# Patient Record
Sex: FEMALE | Race: WHITE | Employment: OTHER | URBAN - METROPOLITAN AREA
[De-identification: names, ages, dates, MRNs, and addresses within clinical notes are randomized per-mention and may not be internally consistent; named-entity substitution may affect disease eponyms.]

---

## 2023-01-01 ENCOUNTER — OFFICE VISIT (OUTPATIENT)
Dept: GERIATRIC MEDICINE | Age: 88
End: 2023-01-01
Payer: COMMERCIAL

## 2023-01-01 DIAGNOSIS — D50.9 IRON DEFICIENCY ANEMIA, UNSPECIFIED IRON DEFICIENCY ANEMIA TYPE: ICD-10-CM

## 2023-01-01 DIAGNOSIS — R53.1 WEAKNESS: Primary | ICD-10-CM

## 2023-01-01 DIAGNOSIS — E11.9 TYPE 2 DIABETES MELLITUS WITHOUT COMPLICATION, WITHOUT LONG-TERM CURRENT USE OF INSULIN (HCC): ICD-10-CM

## 2023-01-01 DIAGNOSIS — F32.A DEPRESSION, UNSPECIFIED DEPRESSION TYPE: ICD-10-CM

## 2023-01-01 DIAGNOSIS — I10 HYPERTENSION, UNSPECIFIED TYPE: ICD-10-CM

## 2023-01-01 DIAGNOSIS — M19.90 ARTHRITIS: ICD-10-CM

## 2023-01-01 PROCEDURE — 99315 NF DSCHRG MGMT 30 MIN/LESS: CPT | Performed by: INTERNAL MEDICINE

## 2023-02-28 LAB
APPEARANCE, URINE: ABNORMAL
BACTERIA, URINE: ABNORMAL /HPF
BILIRUBIN, URINE: NEGATIVE
BLOOD, URINE: NEGATIVE
COLOR, URINE: YELLOW
GLUCOSE, URINE: ABNORMAL MG/DL
KETONES, URINE: NEGATIVE MG/DL
LEUKOCYTE ESTERASE, URINE: ABNORMAL
NITRITE, URINE: NEGATIVE
PH, URINE: 5 (ref 5–8)
PROTEIN, URINE: ABNORMAL MG/DL
RBC, URINE: ABNORMAL /HPF (ref 0–5)
SPECIFIC GRAVITY, URINE: 1.01 (ref 1–1.03)
SQUAMOUS EPITHELIAL CELLS, URINE: 3 /HPF
TRANSITIONAL EPITHELIAL CELLS, URINE: 1 /HPF
UROBILINOGEN, URINE: <2 MG/DL (ref 0–1.9)
WBC, URINE: >100 /HPF (ref 0–5)

## 2023-03-02 LAB — URINE CULTURE: ABNORMAL

## 2023-06-26 ENCOUNTER — OFFICE VISIT (OUTPATIENT)
Dept: GERIATRIC MEDICINE | Age: 88
End: 2023-06-26

## 2023-06-26 DIAGNOSIS — F32.A DEPRESSION, UNSPECIFIED DEPRESSION TYPE: ICD-10-CM

## 2023-06-26 DIAGNOSIS — E11.9 TYPE 2 DIABETES MELLITUS WITHOUT COMPLICATION, WITHOUT LONG-TERM CURRENT USE OF INSULIN (HCC): ICD-10-CM

## 2023-06-26 DIAGNOSIS — R53.1 WEAKNESS: Primary | ICD-10-CM

## 2023-06-26 DIAGNOSIS — M19.90 ARTHRITIS: ICD-10-CM

## 2023-06-26 DIAGNOSIS — D50.9 IRON DEFICIENCY ANEMIA, UNSPECIFIED IRON DEFICIENCY ANEMIA TYPE: ICD-10-CM

## 2023-06-26 DIAGNOSIS — I10 HYPERTENSION, UNSPECIFIED TYPE: ICD-10-CM

## 2023-06-27 RX ORDER — FUROSEMIDE 20 MG/1
20 TABLET ORAL DAILY
COMMUNITY

## 2023-06-27 RX ORDER — GABAPENTIN 300 MG/1
300 CAPSULE ORAL 2 TIMES DAILY
COMMUNITY

## 2023-06-27 RX ORDER — VIT C/E/ZN/COPPR/LUTEIN/ZEAXAN 60 MG-6 MG
1 CAPSULE ORAL DAILY
COMMUNITY

## 2023-06-27 RX ORDER — FERROUS SULFATE 325(65) MG
325 TABLET ORAL
COMMUNITY

## 2023-06-27 RX ORDER — AMMONIUM LACTATE 12 G/100G
CREAM TOPICAL EVERY OTHER DAY
COMMUNITY

## 2023-06-27 RX ORDER — FOLIC ACID 1 MG/1
1 TABLET ORAL DAILY
COMMUNITY

## 2023-06-27 RX ORDER — LANOLIN ALCOHOL/MO/W.PET/CERES
1000 CREAM (GRAM) TOPICAL DAILY
COMMUNITY

## 2023-06-27 RX ORDER — M-VIT,TX,IRON,MINS/CALC/FOLIC 27MG-0.4MG
1 TABLET ORAL DAILY
COMMUNITY

## 2023-06-27 RX ORDER — OMEPRAZOLE 20 MG/1
20 CAPSULE, DELAYED RELEASE ORAL DAILY
COMMUNITY

## 2023-06-27 RX ORDER — ASPIRIN 81 MG/1
81 TABLET, CHEWABLE ORAL DAILY
COMMUNITY

## 2023-06-27 RX ORDER — DOCUSATE SODIUM 100 MG/1
100 CAPSULE, LIQUID FILLED ORAL 2 TIMES DAILY
COMMUNITY

## 2023-06-27 RX ORDER — SERTRALINE HYDROCHLORIDE 25 MG/1
75 TABLET, FILM COATED ORAL DAILY
COMMUNITY

## 2023-06-27 RX ORDER — POTASSIUM CHLORIDE 750 MG/1
10 TABLET, EXTENDED RELEASE ORAL DAILY
COMMUNITY

## 2023-06-27 RX ORDER — GLIPIZIDE 10 MG/1
10 TABLET, FILM COATED, EXTENDED RELEASE ORAL DAILY
COMMUNITY

## 2023-06-27 RX ORDER — TROLAMINE SALICYLATE 10 G/100G
CREAM TOPICAL PRN
COMMUNITY

## 2023-06-27 RX ORDER — LISINOPRIL 5 MG/1
5 TABLET ORAL DAILY
COMMUNITY

## 2023-07-22 ENCOUNTER — OFFICE VISIT (OUTPATIENT)
Dept: GERIATRIC MEDICINE | Age: 88
End: 2023-07-22

## 2023-07-22 DIAGNOSIS — F32.A DEPRESSION, UNSPECIFIED DEPRESSION TYPE: ICD-10-CM

## 2023-07-22 DIAGNOSIS — D50.9 IRON DEFICIENCY ANEMIA, UNSPECIFIED IRON DEFICIENCY ANEMIA TYPE: ICD-10-CM

## 2023-07-22 DIAGNOSIS — R53.1 WEAKNESS: Primary | ICD-10-CM

## 2023-07-22 DIAGNOSIS — E11.9 TYPE 2 DIABETES MELLITUS WITHOUT COMPLICATION, WITHOUT LONG-TERM CURRENT USE OF INSULIN (HCC): ICD-10-CM

## 2023-07-22 DIAGNOSIS — M19.90 ARTHRITIS: ICD-10-CM

## 2023-07-22 DIAGNOSIS — I10 HYPERTENSION, UNSPECIFIED TYPE: ICD-10-CM

## 2023-07-29 NOTE — PROGRESS NOTES
SNF PROGRESS NOTE      Cc- weakness/debility       Patient is a Bipin Ya Aish 80 y.o. female who is being seen at [facility] for general debility and weakness for her 30 day visit. She was just seen by podiatry over the last 30 days         No past medical history on file. Patient has no known allergies. VS reviewed    Gen- Alert and oriented x 3   Heart- RRR no murmur no LE edema   Lungs- CTA b/l no resp distress LE oxygen   Abd- bs x 4                   There is no problem list on file for this patient.           Assessment and Plan    Gen Weakness  HTN  Metoprolol, lisinopril   DM  Metformin, glipizide  Iron deficiency Anemia   Ferrous sulfate  Arthritis  Depression  Sertraline       Sean Davis DO     Electronically signed by: Anali Xavier DO on 7/22/2023

## 2023-08-16 ENCOUNTER — OFFICE VISIT (OUTPATIENT)
Dept: GERIATRIC MEDICINE | Age: 88
End: 2023-08-16

## 2023-08-16 DIAGNOSIS — I10 HYPERTENSION, UNSPECIFIED TYPE: ICD-10-CM

## 2023-08-16 DIAGNOSIS — M19.90 ARTHRITIS: ICD-10-CM

## 2023-08-16 DIAGNOSIS — F32.A DEPRESSION, UNSPECIFIED DEPRESSION TYPE: ICD-10-CM

## 2023-08-16 DIAGNOSIS — R53.1 WEAKNESS: Primary | ICD-10-CM

## 2023-08-16 DIAGNOSIS — E11.9 TYPE 2 DIABETES MELLITUS WITHOUT COMPLICATION, WITHOUT LONG-TERM CURRENT USE OF INSULIN (HCC): ICD-10-CM

## 2023-08-16 DIAGNOSIS — D50.9 IRON DEFICIENCY ANEMIA, UNSPECIFIED IRON DEFICIENCY ANEMIA TYPE: ICD-10-CM

## 2023-09-15 ENCOUNTER — OFFICE VISIT (OUTPATIENT)
Dept: GERIATRIC MEDICINE | Age: 88
End: 2023-09-15

## 2023-09-15 DIAGNOSIS — N18.4 CKD (CHRONIC KIDNEY DISEASE) STAGE 4, GFR 15-29 ML/MIN (HCC): ICD-10-CM

## 2023-09-15 DIAGNOSIS — I10 HYPERTENSION, UNSPECIFIED TYPE: ICD-10-CM

## 2023-09-15 DIAGNOSIS — R53.1 WEAKNESS: Primary | ICD-10-CM

## 2023-09-15 DIAGNOSIS — E11.9 TYPE 2 DIABETES MELLITUS WITHOUT COMPLICATION, WITHOUT LONG-TERM CURRENT USE OF INSULIN (HCC): ICD-10-CM

## 2023-09-15 DIAGNOSIS — M19.90 ARTHRITIS: ICD-10-CM

## 2023-09-15 DIAGNOSIS — F32.A DEPRESSION, UNSPECIFIED DEPRESSION TYPE: ICD-10-CM

## 2023-09-15 DIAGNOSIS — D50.9 IRON DEFICIENCY ANEMIA, UNSPECIFIED IRON DEFICIENCY ANEMIA TYPE: ICD-10-CM

## 2023-09-21 NOTE — PROGRESS NOTES
SNF PROGRESS NOTE      Cc- weakness/debility       Patient is a Deatra Cradle Aish 80 y.o. female who is being seen at [facility] for general debility and weakness for her 30 day visit. Over the last 30 days, metformin was discontinued. There have been no significant events otherwise. No past medical history on file. Patient has no known allergies.     VS reviewed    Gen- Alert and oriented x 3   Heart- RRR no murmur no LE edema   Lungs- CTA b/l no resp distress LE oxygen   Abd- bs x 4           Assessment and Plan    Gen Weakness  HTN  Metoprolol, lisinopril   DM   Glipizide  Metformin discontinued secondary to CKD   Iron deficiency Anemia   Ferrous sulfate  Arthritis  CKD stage 4   Depression  Sertraline       Mikala Alarcon DO [facility]     Electronically signed by: Mickey Naylor DO on 9/15/2023

## 2023-09-25 ENCOUNTER — HOSPITAL ENCOUNTER (INPATIENT)
Dept: DATA CONVERSION | Facility: HOSPITAL | Age: 88
LOS: 2 days | Discharge: HOME | End: 2023-09-27
Attending: INTERNAL MEDICINE | Admitting: STUDENT IN AN ORGANIZED HEALTH CARE EDUCATION/TRAINING PROGRAM
Payer: MEDICARE

## 2023-09-25 DIAGNOSIS — R09.02 HYPOXEMIA: ICD-10-CM

## 2023-09-25 LAB
ALANINE AMINOTRANSFERASE (SGPT) (U/L) IN SER/PLAS: 14 U/L (ref 7–45)
ALBUMIN (G/DL) IN SER/PLAS: 4.2 G/DL (ref 3.4–5)
ALKALINE PHOSPHATASE (U/L) IN SER/PLAS: 131 U/L (ref 33–136)
ANION GAP IN SER/PLAS: 13 MMOL/L (ref 10–20)
ASPARTATE AMINOTRANSFERASE (SGOT) (U/L) IN SER/PLAS: 10 U/L (ref 9–39)
BASOPHILS (10*3/UL) IN BLOOD BY AUTOMATED COUNT: 0.02 X10E9/L (ref 0–0.1)
BASOPHILS/100 LEUKOCYTES IN BLOOD BY AUTOMATED COUNT: 0.1 % (ref 0–2)
BILIRUBIN TOTAL (MG/DL) IN SER/PLAS: 0.4 MG/DL (ref 0–1.2)
CALCIUM (MG/DL) IN SER/PLAS: 9.2 MG/DL (ref 8.6–10.3)
CARBON DIOXIDE, TOTAL (MMOL/L) IN SER/PLAS: 27 MMOL/L (ref 21–32)
CHLORIDE (MMOL/L) IN SER/PLAS: 95 MMOL/L (ref 98–107)
CREATININE (MG/DL) IN SER/PLAS: 1.83 MG/DL (ref 0.5–1.05)
EOSINOPHILS (10*3/UL) IN BLOOD BY AUTOMATED COUNT: 0.12 X10E9/L (ref 0–0.4)
EOSINOPHILS/100 LEUKOCYTES IN BLOOD BY AUTOMATED COUNT: 0.9 % (ref 0–6)
ERYTHROCYTE DISTRIBUTION WIDTH (RATIO) BY AUTOMATED COUNT: 18.6 % (ref 11.5–14.5)
ERYTHROCYTE MEAN CORPUSCULAR HEMOGLOBIN CONCENTRATION (G/DL) BY AUTOMATED: 30.4 G/DL (ref 32–36)
ERYTHROCYTE MEAN CORPUSCULAR VOLUME (FL) BY AUTOMATED COUNT: 86 FL (ref 80–100)
ERYTHROCYTES (10*6/UL) IN BLOOD BY AUTOMATED COUNT: 3.76 X10E12/L (ref 4–5.2)
GFR FEMALE: 25 ML/MIN/1.73M2
GLUCOSE (MG/DL) IN SER/PLAS: 548 MG/DL (ref 74–99)
HEMATOCRIT (%) IN BLOOD BY AUTOMATED COUNT: 32.2 % (ref 36–46)
HEMOGLOBIN (G/DL) IN BLOOD: 9.8 G/DL (ref 12–16)
IMMATURE GRANULOCYTES/100 LEUKOCYTES IN BLOOD BY AUTOMATED COUNT: 0.4 % (ref 0–0.9)
INR IN PPP BY COAGULATION ASSAY: 1.1 (ref 0.9–1.1)
LEUKOCYTES (10*3/UL) IN BLOOD BY AUTOMATED COUNT: 13.4 X10E9/L (ref 4.4–11.3)
LYMPHOCYTES (10*3/UL) IN BLOOD BY AUTOMATED COUNT: 1.21 X10E9/L (ref 0.8–3)
LYMPHOCYTES/100 LEUKOCYTES IN BLOOD BY AUTOMATED COUNT: 9 % (ref 13–44)
MAGNESIUM (MG/DL) IN SER/PLAS: 2.15 MG/DL (ref 1.6–2.4)
MONOCYTES (10*3/UL) IN BLOOD BY AUTOMATED COUNT: 0.25 X10E9/L (ref 0.05–0.8)
MONOCYTES/100 LEUKOCYTES IN BLOOD BY AUTOMATED COUNT: 1.9 % (ref 2–10)
NATRIURETIC PEPTIDE B (PG/ML) IN SER/PLAS: 162 PG/ML (ref 0–99)
NEUTROPHILS (10*3/UL) IN BLOOD BY AUTOMATED COUNT: 11.79 X10E9/L (ref 1.6–5.5)
NEUTROPHILS/100 LEUKOCYTES IN BLOOD BY AUTOMATED COUNT: 87.7 % (ref 40–80)
NRBC (PER 100 WBCS) BY AUTOMATED COUNT: 0 /100 WBC (ref 0–0)
PLATELETS (10*3/UL) IN BLOOD AUTOMATED COUNT: 295 X10E9/L (ref 150–450)
POCT GLUCOSE: 441 MG/DL (ref 74–99)
POCT GLUCOSE: 524 MG/DL (ref 74–99)
POTASSIUM (MMOL/L) IN SER/PLAS: 5.6 MMOL/L (ref 3.5–5.3)
PROTEIN TOTAL: 6.9 G/DL (ref 6.4–8.2)
PROTHROMBIN TIME (PT) IN PPP BY COAGULATION ASSAY: 11.9 SEC (ref 9.8–12.8)
SODIUM (MMOL/L) IN SER/PLAS: 129 MMOL/L (ref 136–145)
TROPONIN I, HIGH SENSITIVITY: 5 NG/L (ref 0–13)
TROPONIN I, HIGH SENSITIVITY: 6 NG/L (ref 0–13)
TROPONIN I, HIGH SENSITIVITY: NORMAL
UREA NITROGEN (MG/DL) IN SER/PLAS: 36 MG/DL (ref 6–23)

## 2023-09-26 LAB
ALBUMIN (G/DL) IN SER/PLAS: 3.7 G/DL (ref 3.4–5)
ANION GAP IN SER/PLAS: 15 MMOL/L (ref 10–20)
APPEARANCE, URINE: CLEAR
BACTERIA, URINE: ABNORMAL /HPF
BILIRUBIN, URINE: NEGATIVE
BLOOD, URINE: NEGATIVE
CALCIUM (MG/DL) IN SER/PLAS: 8.9 MG/DL (ref 8.6–10.3)
CARBON DIOXIDE, TOTAL (MMOL/L) IN SER/PLAS: 24 MMOL/L (ref 21–32)
CHLORIDE (MMOL/L) IN SER/PLAS: 101 MMOL/L (ref 98–107)
COLOR, URINE: ABNORMAL
CREATININE (MG/DL) IN SER/PLAS: 1.5 MG/DL (ref 0.5–1.05)
ERYTHROCYTE DISTRIBUTION WIDTH (RATIO) BY AUTOMATED COUNT: 18.7 % (ref 11.5–14.5)
ERYTHROCYTE MEAN CORPUSCULAR HEMOGLOBIN CONCENTRATION (G/DL) BY AUTOMATED: 31.1 G/DL (ref 32–36)
ERYTHROCYTE MEAN CORPUSCULAR VOLUME (FL) BY AUTOMATED COUNT: 85 FL (ref 80–100)
ERYTHROCYTES (10*6/UL) IN BLOOD BY AUTOMATED COUNT: 3.22 X10E12/L (ref 4–5.2)
GFR FEMALE: 32 ML/MIN/1.73M2
GLUCOSE (MG/DL) IN SER/PLAS: 257 MG/DL (ref 74–99)
GLUCOSE, URINE: ABNORMAL MG/DL
HEMATOCRIT (%) IN BLOOD BY AUTOMATED COUNT: 27.3 % (ref 36–46)
HEMOGLOBIN (G/DL) IN BLOOD: 8.5 G/DL (ref 12–16)
KETONES, URINE: NEGATIVE MG/DL
LEUKOCYTE ESTERASE, URINE: ABNORMAL
LEUKOCYTES (10*3/UL) IN BLOOD BY AUTOMATED COUNT: 13.2 X10E9/L (ref 4.4–11.3)
MAGNESIUM (MG/DL) IN SER/PLAS: 1.99 MG/DL (ref 1.6–2.4)
NITRITE, URINE: NEGATIVE
NRBC (PER 100 WBCS) BY AUTOMATED COUNT: 0 /100 WBC (ref 0–0)
PH, URINE: 5 (ref 5–8)
PHOSPHATE (MG/DL) IN SER/PLAS: 3.9 MG/DL (ref 2.5–4.9)
PLATELETS (10*3/UL) IN BLOOD AUTOMATED COUNT: 308 X10E9/L (ref 150–450)
POCT GLUCOSE: 281 MG/DL (ref 74–99)
POCT GLUCOSE: 381 MG/DL (ref 74–99)
POCT GLUCOSE: 454 MG/DL (ref 74–99)
POTASSIUM (MMOL/L) IN SER/PLAS: 4.7 MMOL/L (ref 3.5–5.3)
PROTEIN, URINE: NEGATIVE MG/DL
RBC, URINE: ABNORMAL /HPF (ref 0–5)
SODIUM (MMOL/L) IN SER/PLAS: 135 MMOL/L (ref 136–145)
SPECIFIC GRAVITY, URINE: 1.01 (ref 1–1.03)
SQUAMOUS EPITHELIAL CELLS, URINE: <1 /HPF
UREA NITROGEN (MG/DL) IN SER/PLAS: 34 MG/DL (ref 6–23)
UROBILINOGEN, URINE: <2 MG/DL (ref 0–1.9)
WBC, URINE: ABNORMAL /HPF (ref 0–5)

## 2023-09-27 LAB
ANION GAP IN SER/PLAS: 15 MMOL/L (ref 10–20)
CALCIUM (MG/DL) IN SER/PLAS: 8.8 MG/DL (ref 8.6–10.3)
CARBON DIOXIDE, TOTAL (MMOL/L) IN SER/PLAS: 24 MMOL/L (ref 21–32)
CHLORIDE (MMOL/L) IN SER/PLAS: 100 MMOL/L (ref 98–107)
CREATININE (MG/DL) IN SER/PLAS: 1.38 MG/DL (ref 0.5–1.05)
ERYTHROCYTE DISTRIBUTION WIDTH (RATIO) BY AUTOMATED COUNT: 18.6 % (ref 11.5–14.5)
ERYTHROCYTE MEAN CORPUSCULAR HEMOGLOBIN CONCENTRATION (G/DL) BY AUTOMATED: 30.9 G/DL (ref 32–36)
ERYTHROCYTE MEAN CORPUSCULAR VOLUME (FL) BY AUTOMATED COUNT: 85 FL (ref 80–100)
ERYTHROCYTES (10*6/UL) IN BLOOD BY AUTOMATED COUNT: 3.27 X10E12/L (ref 4–5.2)
GFR FEMALE: 35 ML/MIN/1.73M2
GLUCOSE (MG/DL) IN SER/PLAS: 371 MG/DL (ref 74–99)
HEMATOCRIT (%) IN BLOOD BY AUTOMATED COUNT: 27.8 % (ref 36–46)
HEMOGLOBIN (G/DL) IN BLOOD: 8.6 G/DL (ref 12–16)
LEUKOCYTES (10*3/UL) IN BLOOD BY AUTOMATED COUNT: 11.1 X10E9/L (ref 4.4–11.3)
MAGNESIUM (MG/DL) IN SER/PLAS: 2.09 MG/DL (ref 1.6–2.4)
NRBC (PER 100 WBCS) BY AUTOMATED COUNT: 0 /100 WBC (ref 0–0)
PLATELETS (10*3/UL) IN BLOOD AUTOMATED COUNT: 269 X10E9/L (ref 150–450)
POCT GLUCOSE: 343 MG/DL (ref 74–99)
POCT GLUCOSE: 389 MG/DL (ref 74–99)
POTASSIUM (MMOL/L) IN SER/PLAS: 4.1 MMOL/L (ref 3.5–5.3)
SODIUM (MMOL/L) IN SER/PLAS: 135 MMOL/L (ref 136–145)
UREA NITROGEN (MG/DL) IN SER/PLAS: 30 MG/DL (ref 6–23)
URINE CULTURE: ABNORMAL

## 2023-09-28 ENCOUNTER — OFFICE VISIT (OUTPATIENT)
Dept: GERIATRIC MEDICINE | Age: 88
End: 2023-09-28

## 2023-09-28 DIAGNOSIS — N39.0 URINARY TRACT INFECTION WITHOUT HEMATURIA, SITE UNSPECIFIED: Primary | ICD-10-CM

## 2023-09-28 DIAGNOSIS — D50.9 IRON DEFICIENCY ANEMIA, UNSPECIFIED IRON DEFICIENCY ANEMIA TYPE: ICD-10-CM

## 2023-09-28 DIAGNOSIS — E11.9 TYPE 2 DIABETES MELLITUS WITHOUT COMPLICATION, WITHOUT LONG-TERM CURRENT USE OF INSULIN (HCC): ICD-10-CM

## 2023-09-28 DIAGNOSIS — F32.A DEPRESSION, UNSPECIFIED DEPRESSION TYPE: ICD-10-CM

## 2023-09-28 DIAGNOSIS — R53.1 WEAKNESS: ICD-10-CM

## 2023-09-29 LAB
ATRIAL RATE: 72 BPM
P AXIS: 102 DEGREES
P OFFSET: 184 MS
P ONSET: 148 MS
PR INTERVAL: 146 MS
Q ONSET: 221 MS
QRS COUNT: 12 BEATS
QRS DURATION: 76 MS
QT INTERVAL: 386 MS
QTC CALCULATION(BAZETT): 422 MS
QTC FREDERICIA: 410 MS
R AXIS: 63 DEGREES
T AXIS: 60 DEGREES
T OFFSET: 414 MS
VENTRICULAR RATE: 72 BPM

## 2023-09-30 NOTE — H&P
"    History of Present Illness:   HPI:    HPI:  Pt is a 89 year old woman with a past medical history of hypertension, diabetes, osteoporosis, history of left hip fracture status post repair, CAD, ROMANA who presents to the ED from her assisted living facility after a fall.  Upon initial arrival to the  ED per reports she was lethargic and unable to give a complete history due to recently receiving IV pain meds while on route. On arrival to the medical floor, patient is alert and conversant.  She reports that she lives alone in her assisted living and  earlier today she was using her walker to maneuver in her home when she \"felt as if my foot slipped and gave out and I fell\".  She denies hitting her head.  She denies history of frequent falls. She denied dizziness, vision changes, palpitations, shortness  of breath, nausea or vomiting, numbness or tingling at the time of fall.  upon arrival to the ED she complained of severe right shoulder pain and x-ray showed acute humeral fracture. She was also noted to have a blood sugar greater than 500 in the ED-given  IVF, insulin not ordered in the ED. In terms of her blood sugar on arrival to the floor, initially patient was insistent that she did not want her blood sugar repeated nor did she want any doses of insulin.  She repeatedly stated \"I am 89, I do not need  to go through all of this\".  Eventually patient did agree to a one-time Accu-Chek this evening and dose of insulin.  Blood sugar on recheck was 441 and patient was given SSI aspart.    In the ED, VSS.  Noted leukocytosis to 13.4.  Hemoglobin 9.8.  Initial blood sugar 548 and recheck on arrival to the floor was 441. Corrected sodium 140.  Potassium 5.6.  Creatinine 1.83.  Troponin negative x2.  UA showed large leukocyte Estrace with  5-20 white cells.  CT head negative.  CT C-spine showed multilevel spondylosis without acute fracture.  Chest x-ray negative.  X-ray of the wrist on the right showed impacted " intra-articular distal radial fracture with soft tissue swelling.  X-ray  of elbow negative for fracture.  X-ray shoulder showed proximal humeral fracture with impaction and displacement. Ortho was contacted who recommended nonoperative management and Ortho evaluation in the a.m.  Patient was given a dose of fentanyl in the  ED and shortly thereafter had a notable desaturation to 83% on room air and was placed on 2 L.  ED team also reported that she became more confused and lethargic after getting dose of fentanyl. Pt admitted for further management.    PMH: as above    Meds: reviewed     Surg hx:left hip fracture repair    FH:noncontributory    SH:Lives alone in assisted living.  Uses a walker.  Denies tobacco, EtOH and illicits.    ROS: Detailed ROS reviewed and negative unless stated above     Comorbidities:   Comorbidites   ·  Comorbid Conditions diabetes, hypertension   ·  Diabetes Type Type 2   ·  Insulin Dependent no   ·  DM Acuity or Status controlled   ·  DM Complications none     Family History:   ·   Family history of diabetes mellitus:     Social History:   Social History   Smoking Status never smoker (1)   Alcohol Use denies(1)   Drug Use denies (1)              Allergies:  ·  No Known Allergies :     Medications Prior to Admission:     acetaminophen 325 mg oral tablet: 2 tab(s) orally every 8 hours, As Needed - for fever - for pain  acetaminophen 325 mg oral tablet: 2 tab(s) orally every 4 hours, As Needed - for fever  trolamine salicylate 10% topical lotion: Apply topically to hip once a day  aspirin 81 mg oral tablet, chewable: 1 tab(s) orally once a day  docusate sodium 100 mg oral capsule: 1 cap(s) orally 2 times a day  ferrous sulfate 325 mg (65 mg elemental iron) oral tablet: 1 tab(s) orally once a day  folic acid 1 mg oral tablet: 1 tab(s) orally once a day  furosemide 20 mg oral tablet: 1 tab(s) orally once a day  gabapentin 300 mg oral capsule: 1 cap(s) orally 2 times a day  glipiZIDE 10 mg  oral tablet, extended release: 1 tab(s) orally once a day  Lac-Hydrin 12% topical cream: Apply topically to feet and legs every other day  lisinopril 5 mg oral tablet: 1 tab(s) orally once a day  metoprolol tartrate 25 mg oral tablet: 0.5 tab(s) orally 2 times a day  Ocuvite Lutein oral capsule: 1 cap(s) orally once a day  omeprazole 40 mg oral delayed release capsule: 1 cap(s) orally once a day  potassium chloride 10 mEq oral tablet, extended release: 1 tab(s) orally once a day  sertraline 25 mg oral tablet: 3 tab(s) orally once a day  Therapeutic-M oral tablet: 1 tab(s) orally once a day  traMADol 50 mg oral tablet: 1 tab(s) orally every 6 hours, As Needed - for pain  Vitamin B12 1000 mcg oral tablet: 1 tab(s) orally once a day.    Objective:     Objective Information:      T   P  R  BP   MAP  SpO2   Value  36.7  81  16  153/77   119  96%  Date/Time 9/25 22:42 9/25 22:42 9/25 22:42 9/25 22:42  9/25 19:08 9/25 22:42  Range  (36.1C - 36.7C )  (77 - 92 )  (15 - 20 )  (141 - 156 )/ (67 - 90 )  (119 - 119 )  (85% - 99% )   As of 25-Sep-2023 23:07:00, patient is on 3 L/min of oxygen via room air.      Pain reported at 9/25 23:07: 8 = Severe    Physical Exam by System     Constitutional: nad, nontoxic, hard of hearing   Eyes: anicteric sclera, eomi   ENMT: moist oropharynx, no oral lesions noted   Respiratory/Thorax: 2L, ctab, nonlabored, completes  full sentences   Cardiovascular: rrr, no mgr, pulses 2+   Gastrointestinal: +bs,soft,nt,nd, no guarding nor  rebound   Musculoskeletal: no evidence of atrophy, 5/5 strength   Extremities: no edema. RUE-significant swelling and  ecchymosis over R. arm, ace wrap in place to fingertips, sling in place, fingertips are wwp, nontender, intact rom though limited by wrapping   Neurological: orientedx3, sensation intact, follows  commands, no facial droop, grossly nonfocal   Psychological: calm/cooperative   Skin: no rashes or lesions     Medications     Medications:           Continuous Medications       --------------------------------  No continuous medications are active       Scheduled Medications       --------------------------------    1. Acetaminophen:  975  mg  Oral  Every 8 Hours    2. Aspirin Chewable:  81  mg  Oral  Daily    3. Cyanocobalamin:  1000  microgram(s)  Oral  Daily    4. Docusate:  100  mg  Oral  2 Times a Day    5. Enoxaparin SubCutaneous:  30  mg  SubCutaneous  Every 24 Hours    6. Ferrous Sulfate:  325  mg  Oral  Daily    7. Folic Acid:  1  mg  Oral  Daily    8. Gabapentin:  300  mg  Oral  2 Times a Day    9. Insulin Lispro Mild Corrective Scale:  unit(s)  SubCutaneous  3 Times a Day Before Meals    10. Lisinopril:  5  mg  Oral  Daily    11. Metoprolol Tartrate:  12.5  mg  Oral  2 Times a Day    12. Pantoprazole:  40  mg  Oral  Daily    13. Sertraline:  75  mg  Oral  Daily    14. Sodium Zirconium Cyclosilicate:  10  gram(s)  Oral  Once         PRN Medications       --------------------------------    1. Dextrose 50% in Water Injectable:  25  gram(s)  IntraVenous Push  Every 15 Minutes    2. Glucagon Injectable:  1  mg  IntraMuscular  Every 15 Minutes    3. Melatonin:  3  mg  Oral  At Bedtime    4. oxyCODONE Immediate Release:  5  mg  Oral  Every 4 Hours    5. Sodium Chloride 0.9% Injectable Flush:  10  mL  IntraVenous Flush  Every 8 Hours and as Needed         Conditional Medication Orders       --------------------------------    1. Dextrose 10% in Water Infusion:  500  mL  IntraVenous  <Continuous>      Recent Lab Results     Results:    CBC: 9/25/2023 17:25              \     Hgb     /                              \     9.8 L    /  WBC  ----------------  Plt               13.4 H    ----------------    295              /     Hct     \                              /     32.2 L    \            RBC: 3.76 L    MCV: 86     Neutrophil %: 87.7      CMP: 9/25/2023 17:25  NA+        Cl-     BUN  /                         129 L   95 L    36 H  /  --------------------------------  Glucose                ---------------------------  548 HH    K+     HCO3-   Creat \                         5.6 H   27    1.83 H  \           \  T Bili  /                    \  0.4  /  AST  x ---- x ALT        10 x ---- x 14         /  Alk P   \               /  131  \  Calcium : 9.2     Anion Gap : 13     Albumin : 4.2     T Protein : 6.9           Coagulation: 9/25/2023 17:25  PT  /                    11.9  /  -------<    INR          ----------<      1.1  PTT\                              \                       Radiology Results     Results:        Impression:    No focal infiltrate.        Signed by Abdulaziz Mcgarth MD     Xray Chest 1 View [Sep 25 2023  7:31PM]      Impression:     1. Impacted intra-articular distal radial fracture.      2. Soft tissue swelling.      3. Question sequela of old triquetral avulsion.         Signed by Abdulaziz Mcgrath MD     Xray Wrist Complete Min 3 View [Sep 25 2023  7:31PM]      Impression:     No fracture.                 Signed by Abdulaziz Mcgrath MD     Xray Elbow Complete Min 3 View [Sep 25 2023  7:31PM]      Impression:     Proximal humeral fracture.         Signed by Abdulaziz Mcgrath MD     Xray Shoulder Complete Min 2 Views [Sep 25 2023  7:31PM]      Impression:  Xray Wrist Complete Min 3 View [Sep 25 2023  6:26PM]      Impression:  Xray Elbow Complete Min 3 View [Sep 25 2023  6:25PM]      Impression:    Multilevel spondylosis without acute osseous abnormality of the  cervical spine.     MACRO:  None     CT C Spine without Contrast [Sep 25 2023  5:34PM]      Impression:    No CT evidence of acute intracranial injury.        MACRO:  None     CT Head without Contrast [Sep 25 2023  5:32PM]      Assessment and Plan:   Assessment:    A/P:  Pt is a 89 year old woman with a past medical history of hypertension, diabetes, osteoporosis, history of left hip fracture status post repair, CAD, ROMANA who presents to the ED from her assisted living  "facility after a fall. Imaging shows acute R. displaced  humeral fracture and impacted intra-articular distal radial fracture. Ortho recommending conservative management.     #Mechanical fall resulting in right displaced humeral fracture and distal radial fracture  #Shoulder pain due to above  #Hyperglycemia  #Leukocytosis, suspect reactive  #Hyperkalemia to 5.6  #RACHAEL  #ROMANA  #DM-not on insulin  #HTN  #Osteoporosis    -unwitnessed fall at home resulting in RUE fracture  -ortho rec non-operative management per discussion with ED team  -f/u AM ortho recs  -pain control with scheduled tylenol and as needed oxy  -continue home lisinopril 5mg and metop 12.5mg  -given IVF bolus in the ED  -BG on floor 441-given 10 units asp per SSI  -note: pt initially did not desire accuchecks nor insulin, though was finally agreeable. pt very well may have an issue with future frequent accucheck  -wean O2 as tolerated. pt was noted to desate in the ED after receiving fentanyl  -f/u urine culture, pt did not report urinary symptoms and thus abx were not started    Misc  low sodium diet  lovenox    Dispo:pending workup and PT    CODE STATUS: DNR/DNI. Code status addressed and patient reports \"I am 89 and would not want aggressive measures\"          Electronic Signatures for Addendum Section:   Dasha Menchaca) (Signed Addendum 26-Sep-2023 05:29)   Certification (Inpatient): I certify that this individual will require an anticipated TWO midnight hospital stay in order to effectively diagnose, treat, and  provide a safe discharge disposition for the above acute complaints as well exacerbation of known chronic illnesses.     Electronic Signatures:  Dasha Menchaca)  (Signed 26-Sep-2023 04:56)   Authored: History of Present Illness, Comorbidities,  Family History, Social History, Allergies, Medications Prior to Admission, Objective, Assessment and Plan, Note Completion      Last Updated: 26-Sep-2023 05:29 by " "Dasha Menchaca)    References:  1.  Data Referenced From \"Patient Profile - Adult v2\" 26-Sep-2023 00:16   "

## 2023-09-30 NOTE — PROGRESS NOTES
Service: Palliative Care     Subjective Data:   RYLEE OSULLIVAN is a 89 year old Female who is Hospital Day # 3.    Additional Information:    Patient will discharge back to the [facility] and will attempt rehab. Only waiting on Ortho restrictions and PT/OT evals.     Update 1041:  Ortho gave non-weight baring upper extremity recommendation. [facility] stating they can eval and treat for PT/OT in house and we do not need precert or evals prior to discharge. Medical team notified as patient and daughter have remained clear  that they are hopeful for discharge back to facility as soon as possible. Patient was started on IV abx for UTI. Medical team to assess for possible PO treatment and discharge. Facility staff will follow for pall/hospice needs post rehab. Palliative care  to sign off. MD, TCC, RN notified.    Objective Data:     Objective Information:      T   P  R  BP   MAP  SpO2   Value  36.6  85  18  142/77      98%  Date/Time 9/27 8:00 9/27 8:00 9/27 8:00 9/27 8:00    9/27 8:00  Range  (36.2C - 37C )  (61 - 85 )  (17 - 18 )  (104 - 142 )/ (54 - 77 )    (93% - 98% )   As of 27-Sep-2023 08:00:00, patient is on 3 L/min of oxygen via nasal cannula.  Highest temp of 37 C was recorded at 9/26 16:00      Pain reported at 9/26 20:00: 0 = None    Assessment and Plan:   Daily Risk Screen:  ·  Does patient have an indwelling urinary catheter? n/a consulting service    ·  Does patient have a central line? n/a consulting service      Code Status:  ·  Code Status DNAR with Added Limitations     Advance Care Planning:  Advance Care Planning: Patient didn't wish to or  was unable to provide advance care plan      Attestation:   Note Completion:  Provider/Team Pager # [phone]         Electronic Signatures:  Florinda Monteiro ([facility])  (Signed 27-Sep-2023 10:45)   Authored: Service, Subjective Data, Objective Data, Assessment  and Plan, Note Completion      Last Updated: 27-Sep-2023 10:45 by Thania  Florinda (LSW)

## 2023-09-30 NOTE — DISCHARGE SUMMARY
Send Summary:   Discharge Summary Providers:  Provider Role Provider Name   · Referring Dasha Menchaca   · Attending Glenys Ortega   · Consulting Brady Astorga   · Primary Eileen Lr       Note Recipients: Eileen Lr MD - [id number]  []       Discharge:    Summary:   Admission Date: .25-Sep-2023 16:43:00   Discharge Date: 27-Sep-2023   Attending Physician at Discharge: Glenys Ortega   Admission Reason: Right proximal humerus and distal  radius fracture(1)   Final Discharge Diagnoses: UTI (urinary tract infection)  shoulder fracture   RACHAEL   Procedures: none   Condition at Discharge: Satisfactory   Disposition at Discharge: Skilled Nursing Facility  (SNF)   Vital Signs:        T   P  R  BP   MAP  SpO2   Value  36.6  85  18  142/77      98%  Date/Time 9/27 8:00 9/27 8:00 9/27 8:00 9/27 8:00    9/27 8:00  Range  (36.2C - 37C )  (61 - 85 )  (17 - 18 )  (104 - 142 )/ (54 - 77 )    (93% - 98% )   As of 27-Sep-2023 08:00:00, patient is on 3 L/min of oxygen via nasal cannula.  Highest temp of 37 C was recorded at 9/26 16:00    Date:            Weight/Scale Type:  Height:   26-Sep-2023 00:16  50.2  kg / bed  149.8  cm  Physical Exam:    Constitutional: nad, nontoxic, hard of hearing  Eyes: anicteric sclera, eomi  ENMT: moist oropharynx, no oral lesions noted  Respiratory/Thorax: 2L sat 98%, ctab, nonlabored, completes full sentences  Cardiovascular: rrr, no mgr, pulses 2+  Gastrointestinal: +bs, soft, nt, nd, no guarding nor rebound  Musculoskeletal: no evidence of atrophy, 5/5 strength  Extremities: no edema. RUE -significant swelling and ecchymosis over R. arm, ace wrap in place to fingertips, sling in place, fingertips are wwp, nontender, intact rom though limited by wrapping  Neurological: orientedx3, sensation intact, follows commands, no facial droop, grossly nonfocal  Psychological: calm/cooperative  Skin: no rashes or lesions    Hospital Course:    Pt is a 89 year old woman  "with a past medical history of hypertension, diabetes, osteoporosis, history of left hip fracture status post repair, CAD, ROMANA who presents  to the ED from her assisted living facility after a fall.  Upon initial arrival to the ED per reports she was lethargic and unable to give a complete history due to recently receiving IV pain meds while on route. On arrival to the medical floor, patient  is alert and conversant.  She reports that she lives alone in her assisted living and earlier today she was using her walker to maneuver in her home when she \"felt as if my foot slipped and gave out and I fell\".  She denies hitting her head.  She denies  history of frequent falls. She denied dizziness, vision changes, palpitations, shortness of breath, nausea or vomiting, numbness or tingling at the time of fall.  upon arrival to the ED she complained of severe right shoulder pain and x-ray showed acute  humeral fracture. She was also noted to have a blood sugar greater than 500 in the ED-given IVF, insulin not ordered in the ED. In terms of her blood sugar on arrival to the floor, initially patient was insistent that she did not want her blood sugar  repeated nor did she want any doses of insulin.  She repeatedly stated \"I am 89, I do not need to go through all of this\".  Eventually patient did agree to a one-time Accu-Chek this evening and dose of insulin.  Blood sugar on recheck was 441 and patient  was given SSI aspart.    In the ED, VSS.  Noted leukocytosis to 13.4.  Hemoglobin 9.8.  Initial blood sugar 548 and recheck on arrival to the floor was 441. Corrected sodium 140.  Potassium 5.6.  Creatinine 1.83.  Troponin negative x2.  UA showed large leukocyte Estrace with  5-20 white cells.  CT head negative.  CT C-spine showed multilevel spondylosis without acute fracture.  Chest x-ray negative.  X-ray of the wrist on the right showed impacted intra-articular distal radial fracture with soft tissue swelling.  X-ray  of elbow " negative for fracture.  X-ray shoulder showed proximal humeral fracture with impaction and displacement. Ortho was contacted who recommended nonoperative management and Ortho evaluation in the a.m.  Patient was given a dose of fentanyl in the  ED and shortly thereafter had a notable desaturation to 83% on room air and was placed on 2 L.  ED team also reported that she became more confused and lethargic after getting dose of fentanyl. Pt admitted for further management.      Hospital course:  Patient was evaluated by orthopedic surgery no plans for any surgical intervention at this time, sling was placed and activity restriction was ordered, PT/OT ordered but did not work with patient yet however her facility called and they can evaluate and  work there no need to wait for PT/OT evaluation at the hospital, UA was compatible with UTI was started on ceftriaxone however urine culture today came back positive for lactobacillus and was started on Augmentin 1 tablet twice daily for 5 days, patient  blood glucose was very high, patient was declining insulin coverage and Accu-Chek, she is aware of the risk from not taking insulin to lower her glucose level, on admission she had RACHAEL improved with fluid, lisinopril and Lasix were held for this reason,  patient and her daughter are very anxious to leave the hospital and start therapy at her facility, patient to repeat a BMP at the facility 9/29 and if that is improving can resume her Lasix and lisinopril, as the patient and her daughter are anxious to  leave the hospital and RACHAEL is trending down nicely with the fluid, patient encouraged to keep herself hydrated and BMP to be repeated outpatient, remained clinically and vitally stable for discharge to facility today.  Palliative were consulted plans  for hospice as for now, will follow with outpatient palliative.      Discharge time>35 mins      Discharge Information:    and Continuing Care:   Lab Results - Pending:     None  Radiology Results - Pending: None   Discharge Instructions:    Activity:           activity with assistance.          No pushing, pulling, or lifting objects greater than 5 pounds.            Weight-bearing Instructions: no weight-bearing right arm.            sling to RUE at all times, may remove briefly for skin checks and hygiene purpose    Nutrition/Diet:           diabetic/carbohydrate counted    Labs:           Lab Test(s):   Basic Metabolic Panel          Date To Be Drawn:   9/29/2023          Call Results To:   Primary care physician/physician at the facility    Additional Orders:           Additional Instructions:   - Start taking Augmentin 1 tablet twice daily for 5 days for your UTI    - your kidney numbers were high on admission,  they are coming down nicely with fluid, keep yourself hydrated, for that reason Lasix and lisinopril were well held, your kidney numbers will be checked again in 2 days 9/29/2023 and if those are back to  normal your primary care doctor/the physician at the facility can resume your lisinopril and Lasix then.  -Your sugars were really high in the hospital and we do respect you declining taking the insulin, please continue taking your home glipizide when you go back to the facility    -You will be evaluated by physical therapy/Occupational Therapy at your facility to address your therapy needs    -Follow-up with orthopedic surgeon in 2 weeks, please call for appointment    -Continue the rest of your home medications without any changes.    Rehab Services:           Occupational Therapy Orders:   Eval and Treat ([facility])          Physical Therapy Orders:   Eval [facility] Treat ([facility])    Care Recommendation:           I recommend that INPATIENT care is required at::   Skilled          Estimated Stay:   30 - 180 days    Follow Up Appointments:    Follow-Up Appointment 01:           Physician/Dept/Service:   Dr. Meyer          Reason for  Referral:   Follow-up on fracture          Call to Schedule in:   2 weeks          Phone Number:   [phone]    Follow-Up Appointment 02:           Physician/Dept/Service:   Primary care physician/physician at the facility          Reason for Referral:   Hospital follow-up, RACHAEL to address resuming lisinopril and Lasix          Call to Schedule in:   2-3 days    Discharge Medications: Home Medication   trolamine salicylate 10% topical lotion - Apply topically to hip once a day  aspirin 81 mg oral tablet, chewable - 1 tab(s) orally once a day  docusate sodium 100 mg oral capsule - 1 cap(s) orally 2 times a day  ferrous sulfate 325 mg (65 mg elemental iron) oral tablet - 1 tab(s) orally once a day  folic acid 1 mg oral tablet - 1 tab(s) orally once a day  gabapentin 300 mg oral capsule - 1 cap(s) orally 2 times a day  glipiZIDE 10 mg oral tablet, extended release - 1 tab(s) orally once a day  Lac-Hydrin 12% topical cream - Apply topically to feet and legs every other day  metoprolol tartrate 25 mg oral tablet - 0.5 tab(s) orally 2 times a day  Ocuvite Lutein oral capsule - 1 cap(s) orally once a day  omeprazole 40 mg oral delayed release capsule - 1 cap(s) orally once a day  potassium chloride 10 mEq oral tablet, extended release - 1 tab(s) orally once a day  sertraline 25 mg oral tablet - 3 tab(s) orally once a day  Therapeutic-M oral tablet - 1 tab(s) orally once a day  Vitamin B12 1000 mcg oral tablet - 1 tab(s) orally once a day  furosemide 20 mg oral tablet - 1 tab(s) orally once a day  lisinopril 5 mg oral tablet - 1 tab(s) orally once a day  amoxicillin-clavulanate 875 mg-125 mg oral tablet - 1 tab(s) orally 2 times a day      PRN Medication   acetaminophen 325 mg oral tablet - 2 tab(s) orally every 8 hours, As Needed - for fever - for pain  acetaminophen 325 mg oral tablet - 2 tab(s) orally every 4 hours, As Needed - for fever  traMADol 50 mg oral tablet - 1 tab(s) orally every 6 hours, As Needed - for pain  "    DNR Status:   ·  Code Status Code Status order at time of discharge: DNR and No Intubation       Electronic Signatures:  Glenys Ortega)  (Signed 27-Sep-2023 13:02)   Authored: Send Summary, Summary Content, Ongoing Care,  DNR Status, Note Completion      Last Updated: 27-Sep-2023 13:02 by Glenys Ortega)    References:  1.  Data Referenced From \"Consult-Orthopaedics\" 26-Sep-2023 11:07   "

## 2023-09-30 NOTE — PROGRESS NOTES
History and Physical      CHIEF COMPLAINT:  UTI     History of Present Illness:      A 80 y.o. female who is being seen at [facility] s/ hospital stay for UTI and BRAD. Patient sugars were elevated but she declined insulin during her stay. Her lasix and lisinopril were held due to BRAD. REVIEW OF SYSTEMS:  A complete 10 Point review of systems was preformed and negative unless previously stated      PMH:  Arthritis   HTN  DM  Iron deficiency Anemia   Depression     Surgical History:  No past surgical history on file. Medications Prior to Admission:    Prior to Admission medications    Medication Sig Start Date End Date Taking? Authorizing Provider   trolamine salicylate (ASPERCREME) 10 % cream Apply topically as needed for Pain Apply topically as needed. For hip pain    Historical Provider, MD   aspirin 81 MG chewable tablet Take 1 tablet by mouth daily Indications: Preventative Treatment    Historical Provider, MD   docusate sodium (COLACE) 100 MG capsule Take 1 capsule by mouth 2 times daily Indications: Constipation    Historical Provider, MD   ferrous sulfate (IRON 325) 325 (65 Fe) MG tablet Take 1 tablet by mouth daily (with breakfast) Indications: Anemia    Historical Provider, MD   folic acid (FOLVITE) 1 MG tablet Take 1 tablet by mouth daily Indications: Anemia    Historical Provider, MD   furosemide (LASIX) 20 MG tablet Take 1 tablet by mouth daily Indications: Edema    Historical Provider, MD   gabapentin (NEURONTIN) 300 MG capsule Take 1 capsule by mouth in the morning and 1 capsule in the evening. Indications: Nerve Disease.     Historical Provider, MD   glipiZIDE (GLUCOTROL XL) 10 MG extended release tablet Take 1 tablet by mouth daily Indications: Type 2 Diabetes Do not crush    Historical Provider, MD   ammonium lactate (AMLACTIN) 12 % cream Apply topically every other day Indications: Dryness Confined to a Specific area of the Body Apply topically every other day to feet & legs

## 2023-09-30 NOTE — PROGRESS NOTES
Service: Orthopaedics     Subjective Data:   RYLEE OSULLIVAN is a 89 year old Female who is Hospital Day # 3.     Sitting up in bed upon entering room.  Appears somewhat agitated.  Verbalizing no complaints.    Objective Data:     Objective Information:      T   P  R  BP   MAP  SpO2   Value  36.6  85  18  142/77      98%  Date/Time 9/27 8:00 9/27 8:00 9/27 8:00 9/27 8:00    9/27 8:00  Range  (36.2C - 37C )  (61 - 85 )  (17 - 18 )  (104 - 142 )/ (54 - 77 )    (93% - 98% )   As of 27-Sep-2023 08:00:00, patient is on 3 L/min of oxygen via nasal cannula.  Highest temp of 37 C was recorded at 9/26 16:00      Pain reported at 9/26 20:00: 0 = None    Physical Exam by System:    Constitutional: Well developed, awake/alert, no distress,  alert and cooperative   ENMT: mucous membranes moist   Respiratory/Thorax: Chest symmetric, no apparent  respiratory distress   Gastrointestinal: Nondistended, soft, non-tender   Extremities: Right upper extremity in sling with  splint sensation appears intact in right upper extremity and fingers of the right hand present radial pulse   Neurological: alert, intact senses   Psychological: Appears agitated   Skin: Warm and dry     Medication:    Medications:      ALTERNATIVE MEDICINES:    1. Melatonin:  3  mg  Oral  At Bedtime   PRN         ANTI-INFECTIVES:    1. cefTRIAXone 1 gram/ Dextrose 5% IVPB Premixed Soln 50 mL:  50  mL  IntraVenous Piggyback  Every 24 Hours      CARDIOVASCULAR AGENTS:    1. Metoprolol Tartrate:  12.5  mg  Oral  2 Times a Day      CENTRAL NERVOUS SYSTEM AGENTS:    1. Acetaminophen:  975  mg  Oral  Every 8 Hours    2. Aspirin Chewable:  81  mg  Oral  Daily    3. oxyCODONE Immediate Release:  5  mg  Oral  Every 4 Hours   PRN       4. Gabapentin:  300  mg  Oral  2 Times a Day      COAGULATION MODIFIERS:    1. Enoxaparin SubCutaneous:  30  mg  SubCutaneous  Every 24 Hours      GASTROINTESTINAL AGENTS:    1. Docusate:  100  mg  Oral  2 Times a Day    2. Pantoprazole:   40  mg  Oral  Daily      METABOLIC AGENTS:    1. Insulin Lispro Mild Corrective Scale:  unit(s)  SubCutaneous  3 Times a Day Before Meals    2. Dextrose 50% in Water Injectable:  25  gram(s)  IntraVenous Push  Every 15 Minutes   PRN       3. Glucagon Injectable:  1  mg  IntraMuscular  Every 15 Minutes   PRN         NUTRITIONAL PRODUCTS:    1. Ferrous Sulfate:  325  mg  Oral  Daily    2. Sodium Chloride 0.9% Injectable Flush:  10  mL  IntraVenous Flush  Every 8 Hours and as Needed   PRN       3. Cyanocobalamin:  1000  microgram(s)  Oral  Daily    4. Folic Acid:  1  mg  Oral  Daily      PSYCHOTHERAPEUTIC AGENTS:    1. Sertraline:  75  mg  Oral  Daily      TOPICAL AGENTS:    1. Lidocaine 4% TransDermal:  2  patch  TransDermal  Every 24 Hours          Conditional Medication Orders       --------------------------------    1. Dextrose 10% in Water Infusion:  500  mL  IntraVenous  <Continuous>          Currently Suspended Medications       --------------------------------    1. Lisinopril:  5  mg  Oral  Daily      Recent Lab Results:    Results:    CBC: 9/27/2023 08:20              \     Hgb     /                              \     8.6 L    /  WBC  ----------------  Plt               11.1       ----------------    269              /     Hct     \                              /     27.8 L    \            RBC: 3.27 L    MCV: 85         Assessment and Plan:   Comorbidities:  ·  Comorbidity anemia   ·  Anemia unknown     Code Status:  ·  Code Status DNAR with Added Limitations     Advance Care Planning:  Advance Care Planning: Patient didn't wish to or  was unable to provide advance care plan        Impression 1: Right proximal and distal radius fractures   Plan for Impression 1: No surgery indicated at this  time  Splint to right upper extremity  Nonweightbearing in right upper extremity  Physical and Occupational Therapy are on consult  Okay for discharge to [facility] from orthopedic standpoint when medically able, please call  with any further questions or concerns, thank you for allowing us to participate in this patient's care, appreciate the consult       Electronic Signatures:  Patricio Arzate (DO)  (Signed 27-Sep-2023 10:23)   Authored: Assessment and Plan   Co-Signer: Service, Subjective Data, Objective Data, Assessment and Plan, Note Completion  Violeta Staley (PAC)  (Signed 27-Sep-2023 09:47)   Authored: Service, Subjective Data, Objective Data, Assessment  and Plan, Note Completion      Last Updated: 27-Sep-2023 10:23 by Patricio Arzate ()

## 2023-10-16 ENCOUNTER — APPOINTMENT (OUTPATIENT)
Dept: ORTHOPEDIC SURGERY | Facility: CLINIC | Age: 88
End: 2023-10-16
Payer: MEDICARE

## 2023-10-16 ENCOUNTER — OFFICE VISIT (OUTPATIENT)
Dept: GERIATRIC MEDICINE | Age: 88
End: 2023-10-16

## 2023-10-16 DIAGNOSIS — F32.A DEPRESSION, UNSPECIFIED DEPRESSION TYPE: ICD-10-CM

## 2023-10-16 DIAGNOSIS — I10 HYPERTENSION, UNSPECIFIED TYPE: ICD-10-CM

## 2023-10-16 DIAGNOSIS — D50.9 IRON DEFICIENCY ANEMIA, UNSPECIFIED IRON DEFICIENCY ANEMIA TYPE: ICD-10-CM

## 2023-10-16 DIAGNOSIS — R53.1 WEAKNESS: Primary | ICD-10-CM

## 2023-10-16 DIAGNOSIS — M19.90 ARTHRITIS: ICD-10-CM

## 2023-10-16 DIAGNOSIS — N18.4 CKD (CHRONIC KIDNEY DISEASE) STAGE 4, GFR 15-29 ML/MIN (HCC): ICD-10-CM

## 2023-10-16 DIAGNOSIS — E11.9 TYPE 2 DIABETES MELLITUS WITHOUT COMPLICATION, WITHOUT LONG-TERM CURRENT USE OF INSULIN (HCC): ICD-10-CM

## 2023-10-25 NOTE — PROGRESS NOTES
Discharge Summary       Discharge Disposition     Discharge Condition N/A      Discharge time 23 min       Medications   Current Outpatient Medications   Medication Sig Dispense Refill    trolamine salicylate (ASPERCREME) 10 % cream Apply topically as needed for Pain Apply topically as needed. For hip pain      aspirin 81 MG chewable tablet Take 1 tablet by mouth daily Indications: Preventative Treatment      docusate sodium (COLACE) 100 MG capsule Take 1 capsule by mouth 2 times daily Indications: Constipation      ferrous sulfate (IRON 325) 325 (65 Fe) MG tablet Take 1 tablet by mouth daily (with breakfast) Indications: Anemia      folic acid (FOLVITE) 1 MG tablet Take 1 tablet by mouth daily Indications: Anemia      furosemide (LASIX) 20 MG tablet Take 1 tablet by mouth daily Indications: Edema      gabapentin (NEURONTIN) 300 MG capsule Take 1 capsule by mouth in the morning and 1 capsule in the evening. Indications: Nerve Disease.       glipiZIDE (GLUCOTROL XL) 10 MG extended release tablet Take 1 tablet by mouth daily Indications: Type 2 Diabetes Do not crush      ammonium lactate (AMLACTIN) 12 % cream Apply topically every other day Indications: Dryness Confined to a Specific area of the Body Apply topically every other day to feet & legs      lisinopril (PRINIVIL;ZESTRIL) 5 MG tablet Take 1 tablet by mouth daily Indications: High Blood Pressure Disorder      metFORMIN (GLUCOPHAGE) 500 MG tablet Take 1 tablet by mouth 2 times daily (with meals) Indications: Type 2 Diabetes      metoprolol tartrate (LOPRESSOR) 25 MG tablet Take 1 tablet by mouth 2 times daily Indications: High Blood Pressure Disorder      Multiple Vitamins-Minerals (OCUVITE-LUTEIN) CAPS multivitamin Take 1 capsule by mouth daily Indications: Nutritional Support      omeprazole (PRILOSEC) 20 MG delayed release capsule Take 1 capsule by mouth daily Indications: Acid

## 2024-03-06 NOTE — CONSULTS
Service:   Service: Orthopaedics     Consult:  Consult requested by (Attending Name): Dasha Menchaca   Reason: 89 year old woman presenting after fall at  SNF resulting in R. humeral fracture.     History of Present Illness:   Admission Reason: Right proximal humerus and distal  radius fracture   HPI:    RYLEE OSULLIVAN is a 89 year old Female assisted living resident past medical history significant for diabetes, hypertension, CAD, osteoporosis, anemia (iron deficiency)  presents following a mechanical fall.  She is oriented to self and year but cannot recall details around her fall yesterday & claims to have had right shoulder surgery 2 days ago.  Now complains of moderate proximal right upper extremity discomfort and  mild right wrist discomfort.  Her right forearm is splinted and right upper extremity is in a sling.  She tells me that she is left-hand dominant and uses a walker for ambulation.  ER imaging reveals: Right proximal humeral fracture and right impacted  intra-articular distal radial fracture.    Past Medical/Surgical History:        Medical History:   History of non-ST elevation myocardial infarction (NSTEMI) :    CAD (coronary artery disease):    History of fracture of left hip:    Hearing loss:    Iron deficiency anemia:    Osteoporosis:    Urinary incontinence in female:    Uterine prolapse:    History of basal cell carcinoma (BCC) of skin:    Colon polyp:    Anxiety:    Hyperlipidemia:    Insulin dependent type 2 diabetes mellitus:        Surg History:   History of non-cataract eye surgery:    History of colonoscopy:    History of open reduction and internal fixation (ORIF) procedure :    History of basal cell carcinoma excision:     Review Family/Social History and ROS:   Family History:  ·   Family history of diabetes mellitus:     Social History:    Smoking Status: never smoker  (1)   Alcohol Use: denies (1)   Drug Use: denies  (1)            Allergies:  ·  No Known Allergies :      Objective:     Objective Information:        T   P  R  BP   MAP  SpO2   Value  36.2  79  18  113/58   119  93%  Date/Time 9/26 8:00 9/26 8:00 9/26 8:00 9/26 8:00  9/25 19:08 9/26 8:00  Range  (36.1C - 36.7C )  (77 - 92 )  (15 - 20 )  (113 - 156 )/ (58 - 90 )  (119 - 119 )  (85% - 99% )   As of 25-Sep-2023 23:07:00, patient is on 3 L/min of oxygen via room air.    Physical Exam by System:    Constitutional: Well developed, awake/alert, oriented  to self and year, no distress, alert and cooperative   Eyes: EOMI, clear sclera   ENMT: mucous membranes moist, no apparent injury,  no lesions seen, hard of hearing   Head/Neck: Neck supple   Respiratory/Thorax: Patent airways, CTAB, normal  breath sounds with good chest expansion, thorax symmetric   Cardiovascular: Regular, rate and rhythm, no murmurs,  2+ equal pulses of the extremities, normal S 1and S 2   Gastrointestinal: Nondistended, soft, non-tender,  no rebound tenderness or guarding   Musculoskeletal: Right upper extremity sling present,  proximal right upper extremity with ecchymosis, edema, is tender to touch  Distal right upper extremity splint present, motor function intact to right fingers, brisk cap refill less than 2 seconds to right fingertips   Extremities: normal extremities, no cyanosis edema,  contusions or wounds, no clubbing   Lymphatic: No significant lymphadenopathy   Psychological: Appropriate mood and behavior   Skin: Warm and dry, no lesions, no rashes     Medications:    Medications:          Continuous Medications       --------------------------------  No continuous medications are active       Scheduled Medications       --------------------------------    1. Acetaminophen:  975  mg  Oral  Every 8 Hours    2. Aspirin Chewable:  81  mg  Oral  Daily    3. cefTRIAXone 1 gram/ Dextrose 5% IVPB Premixed Soln 50 mL:  50  mL  IntraVenous Piggyback  Every 24 Hours    4. Cyanocobalamin:  1000  microgram(s)  Oral  Daily    5. Docusate:  100  mg   Oral  2 Times a Day    6. Enoxaparin SubCutaneous:  30  mg  SubCutaneous  Every 24 Hours    7. Ferrous Sulfate:  325  mg  Oral  Daily    8. Folic Acid:  1  mg  Oral  Daily    9. Gabapentin:  300  mg  Oral  2 Times a Day    10. Insulin Lispro Mild Corrective Scale:  unit(s)  SubCutaneous  3 Times a Day Before Meals    11. Metoprolol Tartrate:  12.5  mg  Oral  2 Times a Day    12. Pantoprazole:  40  mg  Oral  Daily    13. Sertraline:  75  mg  Oral  Daily         PRN Medications       --------------------------------    1. Dextrose 50% in Water Injectable:  25  gram(s)  IntraVenous Push  Every 15 Minutes    2. Glucagon Injectable:  1  mg  IntraMuscular  Every 15 Minutes    3. Melatonin:  3  mg  Oral  At Bedtime    4. oxyCODONE Immediate Release:  5  mg  Oral  Every 4 Hours    5. Sodium Chloride 0.9% Injectable Flush:  10  mL  IntraVenous Flush  Every 8 Hours and as Needed         Conditional Medication Orders       --------------------------------    1. Dextrose 10% in Water Infusion:  500  mL  IntraVenous  <Continuous>         Currently Suspended Medications       --------------------------------    1. Lisinopril:  5  mg  Oral  Daily      Recent Lab Results:    Results:        I have reviewed these laboratory results:    Complete Blood Count  26-Sep-2023 05:42:00      Result Value    White Blood Cell Count  13.2   H   Nucleated Erythrocyte Count  0.0    Red Blood Cell Count  3.22   L   HGB  8.5   L   HCT  27.3   L   MCV  85    MCHC  31.1   L   PLT  308    RDW-CV  18.7   H     Renal Function Panel  26-Sep-2023 05:42:00      Result Value    Glucose, Serum  257   H   NA  135   L   K  4.7    CL  101    Bicarbonate, Serum  24    Anion Gap, Serum  15    BUN  34   H   CREAT  1.50   H   GFR Female  32   A   Calcium, Serum  8.9    Phosphorus, Serum  3.9    ALB  3.7      Magnesium, Serum  26-Sep-2023 05:42:00      Result Value    Magnesium, Serum  1.99      Urinalysis with Culture if Indicated  26-Sep-2023 01:11:00      Result  Value    Color, Urine  STRAW  Reference Range: STRAW,YELLOW    Appearance, Urine  CLEAR    Specific Gravity, Urine  1.015    pH, Urine  5.0    Protein, Urine  NEGATIVE    Glucose, Urine  >=500(3+)   A   Blood, Urine  NEGATIVE    Ketones, Urine  NEGATIVE    Bilirubin, Urine  NEGATIVE    Urobilinogen, Urine  <2.0    Nitrite, Urine  NEGATIVE    Leukocyte Esterase, Urine  LARGE(3+)   A     Urinalysis, Microscopic  26-Sep-2023 01:11:00      Result Value    White Cells  5-20   A   Red Blood Cells  0-5    Epithelial Cells, Squamous  <1    Bacteria, Urine  3+   A     Glucose_POCT  Trending View      Result 25-Sep-2023 23:48:00  25-Sep-2023 18:35:00    Glucose-POCT 441   H   524   H        Troponin I, High Sensitivity  25-Sep-2023 18:15:00      Result Value    Troponin I, High Sensitivity  5        Radiology Results:    Results:        Impression:     1. Impacted intra-articular distal radial fracture.      2. Soft tissue swelling.      3. Question sequela of old triquetral avulsion.         Signed by Abdulaziz Mcgrath MD     Xray Wrist Complete Min 3 View [Sep 25 2023  7:31PM]      Impression:     No fracture.                 Signed by Abdulaziz Mcgrath MD     Xray Elbow Complete Min 3 View [Sep 25 2023  7:31PM]      Impression:     Proximal humeral fracture.         Signed by Abdulaziz Mcgrath MD     Xray Shoulder Complete Min 2 Views [Sep 25 2023  7:31PM]      Impression:  Xray Wrist Complete Min 3 View [Sep 25 2023  6:26PM]      Impression:  Xray Elbow Complete Min 3 View [Sep 25 2023  6:25PM]        Assessment:    Nonoperative management right proximal humerus fracture and right distal radius fracture    1.  I recommended nonoperative care.  Risks benefits alternatives were discussed including malunion, nonunion, persistent pain deformity, cast related complications and need for future treatment occluding revision surgeries.  Patient is in strong agreement with continued nonoperative care    2.  Nonweightbearing right upper  "extremity.  Sling/splint  3.  Follow-up with orthopedics in 2 weeks for repeat x-rays    Patient was seen and evaluated independently in a face-to-face encounter.  I performed a history and physical examination, discussed pertinent diagnostic studies if indicated, and discussed diagnosis and management strategies with both the patient and  the mid-level provider. We reviewed the history, exam and imaging associated with the patient encounter.  We have discussed with the patient the plan of care.  Patient was seen in conjunction with our orthopedic physicians assistant.      Consult Status:  Consult Status    (select all that apply): initial  consult complete, will follow   Consult Order ID: [id number]     Problem/Assessment/Plan:    Impression 1: proximal right humerus fracture   Plan for Impression 1: no surgery indicated  right UE sling  ice intermittently   pain regimen, including tylenol/lidocaine patches, consider low dose narcotics, if needed  NWB right UE  Ok from ortho standpoint to work with PT/OT   Impression 2: distal right radius fracture   Plan for Impression 2: conservative, non op treatment  Dr Meyer adjusted right distal UE splint, monitor for neuro/motor/vascular impairment  neuro vascular checks   R UE sling  NWB right UE    will reassess in am per Dr. Meyer       Electronic Signatures:  Ting Arambula (PAC)  (Signed 26-Sep-2023 17:53)   Authored: Service, History of Present Illness, Past Medical/Surgical  History, Review Family/Social History and ROS, Allergies, Objective, Assessment/Recommendations, Note Completion  Karsten Meyer)  (Signed 27-Sep-2023 07:37)   Authored: Assessment/Recommendations   Co-Signer: Assessment/Recommendations, Note Completion      Last Updated: 27-Sep-2023 07:37 by Karsten Meyer)    References:  1.  Data Referenced From \"Consult-Palliative Care\" 26-Sep-2023 08:48   "

## 2024-03-06 NOTE — CONSULTS
Service:   Service: Palliative Care     Consult:  Consult requested by (Attending Name): Dasha Menchaca   Reason: 89 year old, fall resulting in R. humeral  fracture. code status DNR. would benefit from pall care services     History of Present Illness:   HPI:    RYLEE OSULLIVAN is a 89 year old Female who presented to [facility] post fall with a fractured shoulder. PMH includes: hypertension, diabetes, osteoporosis, history of left  hip fracture status post repair, CAD, ROMANA.     She resides as a long term care resident at [facility] [place]. Palliative care met with patient and daughter, Racheal at bedside today. Introduced palliative care concept and briefly discussed hospice and how that differs. Patient is confused today  but is typically alert and oriented x3. She has been becoming very agitated with staff when they try to check her blood sugars or give insulin or medications. Patient's daughters states that she is always like this and at [place] they respect her  declining of these things. She continually states she is 89 years old and shouldn't have to do that. Palliative care did discuss hospice care as a response to this and daughter states that she would certainly be open to hospice as it does sound in line  with her wishes, however, at this time, patient and daughter are wanting to pursue therapy to get back to functional baseline post fracture. They would like minimal  interventions other than PT/OT and pain management while here and hope for patient to  be transported back to [place] as soon as possible.     At this time, we are waiting on activity restrictions and recommendations from ortho and PT/OT. Palliative SW spoke with staff at [place] inquiring about hospice provider while considering a palliative care referral. They state that they use [facility]  h[facility]ice and will have them follow up when patient finishes rehab. They do not have a palliative care program to refer to,  "but they see their patients regularly for continued goals of care. [place] staff aware that patient may have some acute pain  management needs while her shoulder heals.     Palliative care will continue to follow.     Review Family/Social History and ROS:   Family History:  ·   Family history of diabetes mellitus:     Social History:    Smoking Status: never smoker  (1)   Alcohol Use: denies (1)   Drug Use: denies  (1)            Allergies:  ·  No Known Allergies :       Consult Status:  Consult Order ID: [id number]     Attestation:   Note Completion:  Provider/Team Pager # [phone]         Electronic Signatures:  Florinda Monteiro ([facility])  (Signed 26-Sep-2023 16:17)   Authored: Service, History of Present Illness, Review  Family/Social History and ROS, Allergies, Assessment/Recommendations, Note Completion      Last Updated: 26-Sep-2023 16:17 by Florinda Monteiro ([facility])    References:  1.  Data Referenced From \"History and Physical\" 26-Sep-2023 00:59   "